# Patient Record
Sex: MALE | Race: WHITE
[De-identification: names, ages, dates, MRNs, and addresses within clinical notes are randomized per-mention and may not be internally consistent; named-entity substitution may affect disease eponyms.]

---

## 2022-06-08 ENCOUNTER — HOSPITAL ENCOUNTER (EMERGENCY)
Dept: HOSPITAL 33 - ED | Age: 62
Discharge: LEFT BEFORE DISCHARGE | End: 2022-06-08
Payer: COMMERCIAL

## 2022-06-08 VITALS — OXYGEN SATURATION: 98 % | HEART RATE: 110 BPM

## 2022-06-08 VITALS — SYSTOLIC BLOOD PRESSURE: 165 MMHG | DIASTOLIC BLOOD PRESSURE: 98 MMHG

## 2022-06-08 DIAGNOSIS — Z79.84: ICD-10-CM

## 2022-06-08 DIAGNOSIS — R45.1: Primary | ICD-10-CM

## 2022-06-08 DIAGNOSIS — Z56.0: ICD-10-CM

## 2022-06-08 DIAGNOSIS — R45.851: ICD-10-CM

## 2022-06-08 PROCEDURE — 99284 EMERGENCY DEPT VISIT MOD MDM: CPT

## 2022-06-08 SDOH — ECONOMIC STABILITY - INCOME SECURITY: UNEMPLOYMENT, UNSPECIFIED: Z56.0

## 2022-06-08 NOTE — ERPHSYRPT
- History of Present Illness


Time Seen by Provider: 06/08/22 11:43


Source: patient


Exam Limitations: no limitations


Physician History: 





This is a 62-year-old white male patient who is diabetic and was brought in by 

the police department because of concerns of friends and family regarding 

suicidal comments.  1 of patient's long standing friends by the name of Gordo 

contacted the police department because his friend was making comments that he 

did not want to live in this world any longer and that he was not going to be 

here tomorrow because of worsening condition of the countries that the 

"liberals" were causing.  His friend Gordo did not know exactly what to do but 

there were other friends that no this patient as well and they noticed the same 

worsening conditions and comments that this patient was making.  At some point, 

Gordo contacted the patient's daughter Mohini who also made some comments that 

she noticed a change in this patient's behavior and outlook.  The Police 

Department went to do a welfare check on the patient and became combative not 

understanding initially why they were there.  There was an altercation.  Patient

was brought into the emergency department for psychiatric evaluation.  Upon 

entrance into the emergency department, the patient stated that he did not need 

to be here and he was not going to allow us to evaluate his blood, urine or 

obtain any type of psychiatric evaluation.  Patient explicitly denies suicidal 

ideation or thoughts.  He also denies any homicidal thoughts or ideation.  He is

not allowing us to even check his blood sugar level.  Patient is not suicidal.


Timing/Duration: today


Severity of Symptoms-Max: mild


Severity of Symptoms-Current: mild


Context related to: work, lost job


Suicidal thoughts: other (Patient has no specific plan.  There have been 

comments made and heard by his friends and family and on Facebook that make them

concerned about the possibility of suicide.)


Associated Symptoms: depressed, frustrated


Previous symptoms: no prior history


Allergies/Adverse Reactions: 








Penicillins Allergy (Severe, Verified 06/08/22 11:57)


   Swelling





Home Medications: 








glyBURIDE [Glyburide] 5 mg PO DAILY 06/08/22 [History]








Travel Risk





- International Travel


Have you traveled outside of the country in past 3 weeks: No





- Coronavirus Screening


Are you exhibiting any of the following symptoms?: No


Close contact with a COVID-19 positive Pt in past 14-21 Days: No





- Past Medical History


Pertinent Past Medical History: Yes





- Past Surgical History


Past Surgical History: Yes





- Review of Systems


Constitutional: No Symptoms


Eyes: No Symptoms


Ears, Nose, & Throat: No Symptoms


Respiratory: No Symptoms


Cardiac: No Symptoms


Abdominal/Gastrointestinal: No Symptoms


Genitourinary Symptoms: No Symptoms


Musculoskeletal: No Symptoms


Skin: No Symptoms


Neurological: No Symptoms


Psychological: Depression, Mood Changes, Other (Frustrated)


Endocrine: No Symptoms


Hematologic/Lymphatic: No Symptoms


Immunological/Allergic: No Symptoms


All Other Systems: Reviewed and Negative





- Nursing Vital Signs


Nursing Vital Signs: 


                               Initial Vital Signs











Temperature  99.5 F   06/08/22 11:47


 


Pulse Rate  84   06/08/22 11:47


 


Respiratory Rate  20   06/08/22 11:47


 


Blood Pressure  165/98   06/08/22 11:47


 


O2 Sat by Pulse Oximetry  97   06/08/22 11:47








                                   Pain Scale











Pain Intensity                 0

















- Physical Exam


General Appearance: no apparent distress, alert, anxiety


Eyes, Ears, Nose, Throat Exam: normal ENT inspection, moist mucous membranes


Neck Exam: normal inspection, non-tender, supple, full range of motion


Respiratory Exam: normal breath sounds, lungs clear, airway intact, No chest ten

derness, No respiratory distress


Cardiovascular Exam: regular rate/rhythm, normal heart sounds, normal peripheral

 pulses


Gastrointestinal/Abdominal Exam: soft, normal bowel sounds, No tenderness


Extremities Exam: normal inspection, normal range of motion, No evidence of 

injury


Current Suicidality: denies suicide plan


Neurological Exam: alert, normal mood/affect, CNs II-XII nml as tested, 

agitated, anxious, depressed affect


Appearance: appropriate appearance, appropriate insight, no memory impairment


Behavior/Eye Contact/Speech: avoids eye contact, increased rate of speech, 

agitated, alert & uncooperative, No intoxicated appearance


Thoughts/Hallucinations: normal thought pattern, no apparent hallucination


Skin Exam: normal color, warm, dry


**SpO2 Interpretation**: normal


O2 Delivery: Room Air





- Course


Nursing assessment & vital signs reviewed: Yes


Ordered Tests: 


                               Active Orders 24 hr











 Category Date Time Status


 


 Clean Catch Urine Specimen STAT Care  06/08/22 12:38 Active


 


 EKG-ER Only STAT Care  06/08/22 12:38 Active


 


 POCT Glucose Check STAT Care  06/08/22 12:41 Active


 


 ACETAMINOPHEN Stat Lab  06/08/22 12:40 Ordered


 


 CBC W DIFF Stat Lab  06/08/22 12:38 Ordered


 


 CMP Stat Lab  06/08/22 12:38 Ordered


 


 ETHYL ALCOHOL Stat Lab  06/08/22 12:38 Ordered


 


 SALICYLATE Stat Lab  06/08/22 12:38 Ordered


 


 UA W/RFX CULTURE Stat Lab  06/08/22 Ordered


 


 Urine Triage Profile Stat Lab  06/08/22 12:39 Ordered














- Progress


Progress: unchanged


Progress Note: 





06/08/22 13:13


Medical decision making: This patient was brought into the emergency department 

by the police department because of concerns of suicidal ideation and comments. 

 Patient adamantly denies he is suicidal.  He adamantly denies that he is 

homicidal.  He states that there is nothing that he is going to do to himself or

 anyone else when he goes home.  He is a gun owner.  He has no specific plan or 

intent of hurting himself or anyone else.  However, there have been multiple 

comments made from friends and family members that suggest that this patient is 

having significant increase in frustration and agitation depression and anxiety 

related to what is going around in the world and how it is affecting him and his

 ability to work.  I am trying to keep the patient safe and I told him that if 

the comments made by his friend Gordo were not suggesting any real worsening of 

condition and frustration and possible suicidal comments then I would allow him 

to be discharged to home.  However Gordo stated that he is concerned because his 

friend is exhibiting worsening frustration agitation depression as is his 

daughter Mohini and other friends of this patient.  I told him that if he could 

allow us to obtain a urine sample and blood sample to evaluate him from a 

medical standpoint then have a telemetry mental health evaluation we could 

possibly discharge him to home and he might be able to be treated as an 

outpatient.  However, the patient is refusing blood draw and obtaining urine.  

He stated that he has more rights than what I am telling him.  Patient then left

 the emergency department AMA/elopement.  We will contact law enforcement to 

bring him back to complete a full work-up.


06/08/22 13:27


Medical decision making: Law enforcement has arrived.  From their standpoint 

they do not have enough information or cause to perform an immediate/emergency 

jail on him in the emergency department.  Therefore, they are not going to 

pick him up and return him to the emergency department for further psychiatric 

evaluation.  I will have the nurses inform the patient's family and friend who 

contacted law enforcement.


06/08/22 13:29





Counseled pt/family regarding: diagnosis





- Departure


Departure Disposition: AMA


Clinical Impression: 


 Agitation, Suicidal risk





Condition: Stable


Critical Care Time: No
The patient/caregiver verbalized understanding of how to care for the injured extremity with splint/Pre-application: Motor, sensory, and vascular responses intact in the injured extremity./Post-application: Motor, sensory, and vascular responses intact in the injured extremity.